# Patient Record
Sex: FEMALE | URBAN - METROPOLITAN AREA
[De-identification: names, ages, dates, MRNs, and addresses within clinical notes are randomized per-mention and may not be internally consistent; named-entity substitution may affect disease eponyms.]

---

## 2024-03-01 PROCEDURE — 93005 ELECTROCARDIOGRAM TRACING: CPT

## 2024-03-01 PROCEDURE — 99285 EMERGENCY DEPT VISIT HI MDM: CPT | Mod: 25

## 2024-03-01 PROCEDURE — 96360 HYDRATION IV INFUSION INIT: CPT

## 2024-03-02 ENCOUNTER — HOSPITAL ENCOUNTER (EMERGENCY)
Facility: HOSPITAL | Age: 44
Discharge: PSYCHIATRIC OR SUBSTANCE USE FACILITY | End: 2024-03-02
Attending: EMERGENCY MEDICINE
Payer: COMMERCIAL

## 2024-03-02 VITALS
SYSTOLIC BLOOD PRESSURE: 151 MMHG | DIASTOLIC BLOOD PRESSURE: 84 MMHG | RESPIRATION RATE: 20 BRPM | BODY MASS INDEX: 30.68 KG/M2 | TEMPERATURE: 98 F | HEART RATE: 61 BPM | HEIGHT: 64 IN | WEIGHT: 179.69 LBS | OXYGEN SATURATION: 98 %

## 2024-03-02 DIAGNOSIS — F32.A DEPRESSION WITH SUICIDAL IDEATION: ICD-10-CM

## 2024-03-02 DIAGNOSIS — S20.211A RIB CONTUSION, RIGHT, INITIAL ENCOUNTER: ICD-10-CM

## 2024-03-02 DIAGNOSIS — R45.851 DEPRESSION WITH SUICIDAL IDEATION: ICD-10-CM

## 2024-03-02 DIAGNOSIS — T50.902A SUICIDE ATTEMPT BY DRUG OVERDOSE: ICD-10-CM
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
## 2024-03-02 DIAGNOSIS — S09.90XA MINOR HEAD INJURY, INITIAL ENCOUNTER: ICD-10-CM

## 2024-03-02 DIAGNOSIS — S46.911A STRAIN OF RIGHT SHOULDER, INITIAL ENCOUNTER: ICD-10-CM

## 2024-03-02 DIAGNOSIS — T50.901A OVERDOSE: ICD-10-CM

## 2024-03-02 DIAGNOSIS — T14.90XA TRAUMA: ICD-10-CM

## 2024-03-02 LAB
ALBUMIN SERPL-MCNC: 4 G/DL (ref 3.5–5)
ALBUMIN/GLOB SERPL: 1.2 RATIO (ref 1.1–2)
ALP SERPL-CCNC: 105 UNIT/L (ref 40–150)
ALT SERPL-CCNC: 68 UNIT/L (ref 0–55)
AMPHET UR QL SCN: NEGATIVE
APAP SERPL-MCNC: <17.4 UG/ML (ref 17.4–30)
APPEARANCE UR: CLEAR
AST SERPL-CCNC: 48 UNIT/L (ref 5–34)
B-HCG SERPL QL: NEGATIVE
BACTERIA #/AREA URNS AUTO: ABNORMAL /HPF
BARBITURATE SCN PRESENT UR: NEGATIVE
BASOPHILS # BLD AUTO: 0.04 X10(3)/MCL
BASOPHILS NFR BLD AUTO: 0.6 %
BENZODIAZ UR QL SCN: POSITIVE
BILIRUB SERPL-MCNC: 0.3 MG/DL
BILIRUB UR QL STRIP.AUTO: NEGATIVE
BUN SERPL-MCNC: 11.8 MG/DL (ref 7–18.7)
CALCIUM SERPL-MCNC: 9.7 MG/DL (ref 8.4–10.2)
CANNABINOIDS UR QL SCN: NEGATIVE
CHLORIDE SERPL-SCNC: 112 MMOL/L (ref 98–107)
CK SERPL-CCNC: 182 U/L (ref 29–168)
CO2 SERPL-SCNC: 21 MMOL/L (ref 22–29)
COCAINE UR QL SCN: NEGATIVE
COLOR UR AUTO: ABNORMAL
CREAT SERPL-MCNC: 0.87 MG/DL (ref 0.55–1.02)
EOSINOPHIL # BLD AUTO: 0.39 X10(3)/MCL (ref 0–0.9)
EOSINOPHIL NFR BLD AUTO: 5.7 %
ERYTHROCYTE [DISTWIDTH] IN BLOOD BY AUTOMATED COUNT: 13.4 % (ref 11.5–17)
ETHANOL SERPL-MCNC: <10 MG/DL
FENTANYL UR QL SCN: NEGATIVE
GFR SERPLBLD CREATININE-BSD FMLA CKD-EPI: >60 MLS/MIN/1.73/M2
GLOBULIN SER-MCNC: 3.3 GM/DL (ref 2.4–3.5)
GLUCOSE SERPL-MCNC: 83 MG/DL (ref 74–100)
GLUCOSE UR QL STRIP.AUTO: NORMAL
HCT VFR BLD AUTO: 46.3 % (ref 37–47)
HGB BLD-MCNC: 15.3 G/DL (ref 12–16)
IMM GRANULOCYTES # BLD AUTO: 0.01 X10(3)/MCL (ref 0–0.04)
IMM GRANULOCYTES NFR BLD AUTO: 0.1 %
KETONES UR QL STRIP.AUTO: NEGATIVE
LEUKOCYTE ESTERASE UR QL STRIP.AUTO: NEGATIVE
LYMPHOCYTES # BLD AUTO: 2.42 X10(3)/MCL (ref 0.6–4.6)
LYMPHOCYTES NFR BLD AUTO: 35.4 %
MCH RBC QN AUTO: 32.1 PG (ref 27–31)
MCHC RBC AUTO-ENTMCNC: 33 G/DL (ref 33–36)
MCV RBC AUTO: 97.1 FL (ref 80–94)
MDMA UR QL SCN: NEGATIVE
MONOCYTES # BLD AUTO: 0.55 X10(3)/MCL (ref 0.1–1.3)
MONOCYTES NFR BLD AUTO: 8.1 %
MUCOUS THREADS URNS QL MICRO: ABNORMAL /LPF
NEUTROPHILS # BLD AUTO: 3.42 X10(3)/MCL (ref 2.1–9.2)
NEUTROPHILS NFR BLD AUTO: 50.1 %
NITRITE UR QL STRIP.AUTO: NEGATIVE
NRBC BLD AUTO-RTO: 0 %
OHS QRS DURATION: 92 MS
OHS QTC CALCULATION: 431 MS
OPIATES UR QL SCN: POSITIVE
PCP UR QL: NEGATIVE
PH UR STRIP.AUTO: 7 [PH]
PH UR: 7 [PH] (ref 3–11)
PLATELET # BLD AUTO: 241 X10(3)/MCL (ref 130–400)
PMV BLD AUTO: 10.3 FL (ref 7.4–10.4)
POTASSIUM SERPL-SCNC: 4 MMOL/L (ref 3.5–5.1)
PROT SERPL-MCNC: 7.3 GM/DL (ref 6.4–8.3)
PROT UR QL STRIP.AUTO: ABNORMAL
RBC # BLD AUTO: 4.77 X10(6)/MCL (ref 4.2–5.4)
RBC #/AREA URNS AUTO: ABNORMAL /HPF
RBC UR QL AUTO: NEGATIVE
SALICYLATES SERPL-MCNC: <5 MG/DL (ref 15–30)
SARS-COV-2 RDRP RESP QL NAA+PROBE: NEGATIVE
SODIUM SERPL-SCNC: 141 MMOL/L (ref 136–145)
SP GR UR STRIP.AUTO: >1.05 (ref 1–1.03)
SPECIFIC GRAVITY, URINE AUTO (.000) (OHS): >1.05 (ref 1–1.03)
SQUAMOUS #/AREA URNS LPF: ABNORMAL /HPF
TSH SERPL-ACNC: 2.88 UIU/ML (ref 0.35–4.94)
UROBILINOGEN UR STRIP-ACNC: 4
WBC # SPEC AUTO: 6.83 X10(3)/MCL (ref 4.5–11.5)
WBC #/AREA URNS AUTO: ABNORMAL /HPF

## 2024-03-02 PROCEDURE — 25000003 PHARM REV CODE 250: Performed by: EMERGENCY MEDICINE

## 2024-03-02 PROCEDURE — 80143 DRUG ASSAY ACETAMINOPHEN: CPT | Performed by: EMERGENCY MEDICINE

## 2024-03-02 PROCEDURE — 87635 SARS-COV-2 COVID-19 AMP PRB: CPT | Performed by: EMERGENCY MEDICINE

## 2024-03-02 PROCEDURE — 84443 ASSAY THYROID STIM HORMONE: CPT | Performed by: EMERGENCY MEDICINE

## 2024-03-02 PROCEDURE — 85025 COMPLETE CBC W/AUTO DIFF WBC: CPT | Performed by: EMERGENCY MEDICINE

## 2024-03-02 PROCEDURE — 82077 ASSAY SPEC XCP UR&BREATH IA: CPT | Performed by: EMERGENCY MEDICINE

## 2024-03-02 PROCEDURE — 80179 DRUG ASSAY SALICYLATE: CPT | Performed by: EMERGENCY MEDICINE

## 2024-03-02 PROCEDURE — 81025 URINE PREGNANCY TEST: CPT | Performed by: EMERGENCY MEDICINE

## 2024-03-02 PROCEDURE — 80053 COMPREHEN METABOLIC PANEL: CPT | Performed by: EMERGENCY MEDICINE

## 2024-03-02 PROCEDURE — 82550 ASSAY OF CK (CPK): CPT | Performed by: EMERGENCY MEDICINE

## 2024-03-02 PROCEDURE — 81001 URINALYSIS AUTO W/SCOPE: CPT | Performed by: EMERGENCY MEDICINE

## 2024-03-02 PROCEDURE — 25500020 PHARM REV CODE 255: Performed by: EMERGENCY MEDICINE

## 2024-03-02 PROCEDURE — 80307 DRUG TEST PRSMV CHEM ANLYZR: CPT | Performed by: EMERGENCY MEDICINE

## 2024-03-02 RX ORDER — IBUPROFEN 200 MG
400 TABLET ORAL
Status: COMPLETED | OUTPATIENT
Start: 2024-03-02 | End: 2024-03-02

## 2024-03-02 RX ORDER — LIDOCAINE 50 MG/G
1 PATCH TOPICAL
Status: DISCONTINUED | OUTPATIENT
Start: 2024-03-02 | End: 2024-03-02 | Stop reason: HOSPADM

## 2024-03-02 RX ORDER — METOPROLOL TARTRATE 50 MG/1
50 TABLET ORAL 2 TIMES DAILY
Status: DISCONTINUED | OUTPATIENT
Start: 2024-03-02 | End: 2024-03-02 | Stop reason: HOSPADM

## 2024-03-02 RX ORDER — HYDROCHLOROTHIAZIDE 25 MG/1
25 TABLET ORAL DAILY
Status: DISCONTINUED | OUTPATIENT
Start: 2024-03-02 | End: 2024-03-02 | Stop reason: HOSPADM

## 2024-03-02 RX ADMIN — IOHEXOL 100 ML: 350 INJECTION, SOLUTION INTRAVENOUS at 01:03

## 2024-03-02 RX ADMIN — IBUPROFEN 400 MG: 200 TABLET, FILM COATED ORAL at 09:03

## 2024-03-02 RX ADMIN — SODIUM CHLORIDE 1000 ML: 9 INJECTION, SOLUTION INTRAVENOUS at 12:03

## 2024-03-02 RX ADMIN — HYDROCHLOROTHIAZIDE 25 MG: 25 TABLET ORAL at 09:03

## 2024-03-02 RX ADMIN — LIDOCAINE 1 PATCH: 50 PATCH CUTANEOUS at 09:03

## 2024-03-02 NOTE — ED PROVIDER NOTES
"Encounter Date: 3/1/2024       History     Chief Complaint   Patient presents with    Suicide Attempt     Pt reports taking "entire bottles" of these medications over several different nights: Lortabs, klonopin, bystallic, tramadol, topamax - last was a bottle of topamax last night around 2200. Pt reports her  has terminal CA and is extremely depressed, "needs help". Sisters report this began spiraling over last few months beginning with ETOH dependency. Also reports falling today and injured right shoulder and under right breast     43-year-old female presents to the emergency department for evaluation after intentional overdose on multiple medications over the last few days.  Reports last ingestion was Topamax on Thursday evening, states she took "the whole bottle".  Admits that she has been intentionally harming herself in the setting of depression related to her 's terminal illness with a cancer diagnosis.  Reports has fallen multiple times over the last couple of days, unsure if she struck her head but complaining of right shoulder pain, right chest wall pain.    The history is provided by the patient and a relative.     Review of patient's allergies indicates:   Allergen Reactions    Pcn [penicillins]     Sulfa (sulfonamide antibiotics) Itching     No past medical history on file.  No past surgical history on file.  No family history on file.     Review of Systems   Constitutional:  Negative for fever.   Respiratory:  Negative for chest tightness and shortness of breath.    Cardiovascular:  Positive for chest pain.   Gastrointestinal:  Negative for abdominal pain, nausea and vomiting.   Neurological:  Negative for headaches.   Psychiatric/Behavioral:  Positive for self-injury and suicidal ideas.        Physical Exam     Initial Vitals [03/01/24 2356]   BP Pulse Resp Temp SpO2   (!) 159/101 75 14 98.1 °F (36.7 °C) 100 %      MAP       --         Physical Exam    Nursing note and vitals " reviewed.  Constitutional: She appears well-developed and well-nourished. No distress.   HENT:   Head: Normocephalic and atraumatic.   Mouth/Throat: Oropharynx is clear and moist.   Eyes: Conjunctivae are normal. No scleral icterus.   Neck: Neck supple.   Normal range of motion.  Cardiovascular:  Normal rate and regular rhythm.           Pulmonary/Chest: Breath sounds normal. She exhibits tenderness (right chest wall).   Abdominal: Abdomen is soft. She exhibits no distension. There is no abdominal tenderness.   Musculoskeletal:      Cervical back: Normal range of motion and neck supple.      Comments: Right shoulder tenderness, no gross deformity, pain with range of motion     Neurological: She is alert and oriented to person, place, and time. She has normal strength. No cranial nerve deficit. GCS score is 15. GCS eye subscore is 4. GCS verbal subscore is 5. GCS motor subscore is 6.   Slurred speech   Skin: Skin is warm and dry.         ED Course   Procedures  Labs Reviewed   COMPREHENSIVE METABOLIC PANEL - Abnormal; Notable for the following components:       Result Value    Chloride 112 (*)     Carbon Dioxide 21 (*)     Alanine Aminotransferase 68 (*)     Aspartate Aminotransferase 48 (*)     All other components within normal limits   URINALYSIS, REFLEX TO URINE CULTURE - Abnormal; Notable for the following components:    Specific Gravity, UA >1.050 (*)     Protein, UA Trace (*)     Urobilinogen, UA 4.0 (*)     Mucous, UA Trace (*)     All other components within normal limits   DRUG SCREEN, URINE (BEAKER) - Abnormal; Notable for the following components:    Benzodiazepine, Urine Positive (*)     Opiates, Urine Positive (*)     Specific Gravity, Urine Auto >1.050 (*)     All other components within normal limits    Narrative:     Cut off concentrations:    Amphetamines - 1000 ng/ml  Barbiturates - 200 ng/ml  Benzodiazepine - 200 ng/ml  Cannabinoids (THC) - 50 ng/ml  Cocaine - 300 ng/ml  Fentanyl - 1.0 ng/ml  MDMA  - 500 ng/ml  Opiates - 300 ng/ml   Phencyclidine (PCP) - 25 ng/ml    Specimen submitted for drug analysis and tested for pH and specific gravity in order to evaluate sample integrity. Suspect tampering if specific gravity is <1.003 and/or pH is not within the range of 4.5 - 8.0  False negatives may result form substances such as bleach added to urine.  False positives may result for the presence of a substance with similar chemical structure to the drug or its metabolite.    This test provides only a PRELIMINARY analytical test result. A more specific alternate chemical method must be used in order to obtain a confirmed analytical result. Gas chromatography/mass spectrometry (GC/MS) is the preferred confirmatory method. Other chemical confirmation methods are available. Clinical consideration and professional judgement should be applied to any drug of abuse test result, particularly when preliminary positive results are used.    Positive results will be confirmed only at the physicians request. Unconfirmed screening results are to be used only for medical purposes (treatment).        ACETAMINOPHEN LEVEL - Abnormal; Notable for the following components:    Acetaminophen Level <17.4 (*)     All other components within normal limits   CBC WITH DIFFERENTIAL - Abnormal; Notable for the following components:    MCV 97.1 (*)     MCH 32.1 (*)     All other components within normal limits   CK - Abnormal; Notable for the following components:    Creatine Kinase 182 (*)     All other components within normal limits   SALICYLATE LEVEL - Abnormal; Notable for the following components:    Salicylate Level <5.0 (*)     All other components within normal limits   ACETAMINOPHEN LEVEL - Abnormal; Notable for the following components:    Acetaminophen Level <17.4 (*)     All other components within normal limits   TSH - Normal   ALCOHOL,MEDICAL (ETHANOL) - Normal   SARS-COV-2 RNA AMPLIFICATION, QUAL - Normal    Narrative:     The  IDNOW COVID-19 assay is a rapid molecular in vitro diagnostic test utilizing an isothermal nucleic acid amplification technology intended for the qualitative detection of nucleic acid from the SARS-CoV-2 viral RNA in direct nasal, nasopharyngeal or throat swabs from individuals who are suspected of COVID-19 by their healthcare provider.   CBC W/ AUTO DIFFERENTIAL    Narrative:     The following orders were created for panel order CBC auto differential.  Procedure                               Abnormality         Status                     ---------                               -----------         ------                     CBC with Differential[[id number]]       Abnormal            Final result                 Please view results for these tests on the individual orders.     EKG Readings: (Independently Interpreted)   Initial Reading: No STEMI. Rhythm: Normal Sinus Rhythm. Heart Rate: 72. Ectopy: No Ectopy. ST Segments: Normal ST Segments. T Waves: Normal. Axis: Normal. Clinical Impression: Normal Sinus Rhythm   03/01/2024 @ 2357       Imaging Results              CT Cervical Spine Without Contrast (Preliminary result)  Result time 03/02/24 02:15:30      Preliminary result by Eliseo Yates MD (03/02/24 02:15:30)                   Narrative:    START OF REPORT:  Technique: CT of the cervical spine was performed without intravenous contrast with axial as well as sagittal and coronal images.    Comparison: None.    Dosage Information: Automated exposure control was utilized.    Clinical history: Fall injury.    Findings:  Lung apices: The visualized lung apices appear unremarkable.  Spine:  Spinal canal: The spinal canal appears unremarkable.  Rotation: No significant rotation is seen.  Vertebral Fusion: No vertebral fusion is identified.  Listhesis: No significant listhesis is identified.  Lordosis: Mild straightening of the cervical lordosis is seen. This may be positional or reflect an element of  myospasm.  Intervertebral disc spaces: The intervertebral discs are preserved throughout.  Osteophytes: A medium sized spur is seen at the anteroinferior endplate of C5.  Endplate Sclerosis: No significant endplate sclerosis is seen.  Uncovertebral degenerative changes: Mild multilevel uncovertebral joint arthrosis is seen.  Facet degenerative changes: Mild multilevel facet degenerative changes are seen.  Calcifications: There is segmental calcification of the nuchal ligament on series 6 image 25.  Fractures: No acute cervical spine fracture dislocation or subluxation is seen.  Orthopedic Hardware: None.    Miscellaneous:  Mastoid air cells: Moderate opacity is seen in the bilateral mastoid air cells with sclerotic changes, more on the right. This suggests an element of mastoiditis. Correlate with clinical and laboratory findings as regards additional evaluation and follow up.  Soft Tissues: Unremarkable.      Impression:  1. No acute cervical spine fracture dislocation or subluxation is seen.  2. Degenerative changes and other details as above.                                         CT Chest With Contrast (Preliminary result)  Result time 03/02/24 02:09:21      Preliminary result by Eliseo Yates MD (03/02/24 02:09:21)                   Narrative:    START OF REPORT:  Technique: CT Scan of the chest was performed without intravenous contrast with direct axial as well as sagittal and, coronal, reconstruction images.    Dosage Information: Automated Exposure Control was utilized 750.59 mGy.cm.    Comparison: None.    Clinical History: Fall injury.    Findings:  Soft Tissues: Unremarkable.  Lines and Tubes: None.  Neck: The visualized soft tissues of the neck appear unremarkable.  Mediastinum: The mediastinal structures are within normal limits.  Heart: The heart appears unremarkable.  Aorta: Unremarkable appearing aorta. No aortic dissection or aneurysm is seen.  Pulmonary Arteries: Unremarkable. No filling  defects are seen in the pulmonary arteries to suggest pulmonary embolus.  Lungs: There is moderate non specific dependent change at the lung bases. No acute focal consolidation is identified.  Pleura: No effusions or pneumothorax are identified.  Bony Structures:  Spine: The visualized dorsal spine appears unremarkable.  Ribs: No rib fractures are identified. The bilateral ribs appear unremarkable.  Abdomen: The visualized upper abdominal organs appear unremarkable.      Impression:  1. No rib fractures are identified.  2. No acute focal consolidation is identified.  3. No acute intrathoracic process is identified. Details and other findings as discussed above.                                         CT Head Without Contrast (Preliminary result)  Result time 03/02/24 02:06:01      Preliminary result by Eliseo Yates MD (03/02/24 02:06:01)                   Narrative:    START OF REPORT:  Technique: CT of the head was performed without intravenous contrast with axial as well as coronal and sagittal images.    Comparison: None.    Dosage Information: Automated Exposure Control was utilized 1082.46 mGy.cm.    Clinical history: Fall injury.    Findings:  Hemorrhage: No acute intracranial hemorrhage is seen.  CSF spaces: The ventricles sulci and basal cisterns are within normal limits.  Brain parenchyma: There is preservation of the grey white junction throughout. No acute infarct.  Cerebellum: Unremarkable.  Vascular: Unremarkable venous sinuses.  Sella and skull base: The sella appears to be within normal limits for age.  Cerebellopontine angles: Within normal limits.  Herniation: None.  Intracranial calcifications: Incidental note is made of bilateral choroid plexus calcification. Incidental note is made of some pineal region calcification.  Calvarium: No acute linear or depressed skull fracture is seen.  Scalp: No significant scalp soft tissue swelling is seen.    Maxillofacial Structures:  Paranasal sinuses: The  visualized paranasal sinuses appear clear with no mucoperiosteal thickening or air fluid levels identified.  Orbits: The orbits appear unremarkable.  Zygomatic arches: The zygomatic arches are intact and unremarkable.  Temporal bones and mastoids: The bilateral mastoid air cells partially opacified somewhat sclerotic a possible element of mastoiditis.  TMJ: The mandibular condyles appear normally placed with respect to the mandibular fossa.      Impression:  1. The bilateral mastoid air cells partially opacified somewhat sclerotic a possible element of mastoiditis. Correlate clinically.  2. No acute intracranial traumatic injury identified. Details and other findings as noted above.                                         X-Ray Chest AP Portable (In process)                      X-ray Shoulder 2 or More Views Right (In process)                      Medications   hydroCHLOROthiazide tablet 25 mg (has no administration in time range)   metoprolol tartrate (LOPRESSOR) tablet 50 mg (has no administration in time range)   sodium chloride 0.9% bolus 1,000 mL 1,000 mL (0 mLs Intravenous Stopped 3/2/24 0115)   iohexoL (OMNIPAQUE 350) injection 100 mL (100 mLs Intravenous Given 3/2/24 0157)     Medical Decision Making  Problems Addressed:  Depression with suicidal ideation: acute illness or injury that poses a threat to life or bodily functions  Fall, initial encounter: acute illness or injury  Minor head injury, initial encounter: acute illness or injury  Rib contusion, right, initial encounter: acute illness or injury  Strain of right shoulder, initial encounter: acute illness or injury  Suicide attempt by drug overdose: acute illness or injury that poses a threat to life or bodily functions    Amount and/or Complexity of Data Reviewed  Labs: ordered.  Radiology: ordered.    Risk  Prescription drug management.      ED assessment:    Mrs. Westfall presented with report of intentional overdose as suicidal gesture.  Reports  multiple substances over the last several days.  Additionally reports somnolent, multiple falls over the last several days as well.  Physician's Emergency certificate filed for danger to self given the patient's verbalized suicidal ideations as well as her suicide gesture.  With the multiple falls, speech changes in right chest wall pain, will obtain imaging to ensure that she has not sustained any clinically significant traumatic injuries.  In addition, will undertake laboratory studies as part of protocol to medically clear for psychiatric admission.  Case discussed with poison control who recommended toxicology labs including acetaminophen level both on arrival as well as after 4 hour observation.  Advised if remains stable after 6 hours observation in the ED, would be able to medically clear for psychiatric placement at that time.    Differential diagnosis (including but not limited to):   Depression, suicidal ideation, intentional overdose, suicide attempt, polysubstance intoxication, acute kidney injury, electrolyte derangements, rhabdomyolysis, closed head injury, intracranial hemorrhage, cerebral contusion, concussion, rib fracture, chest contusion, shoulder fracture, clavicle fracture, shoulder strain, contusion    ED management:   See ED course below    My independent radiology interpretation:   Chest x-ray:  No displaced rib fracture, no pneumothorax, no focal infiltrate or effusion   X-ray right shoulder:  No acute fracture subluxation.    Amount and/or Complexity of Data Reviewed  Independent historian: daughter    Summary of history:  Daughters at bedside reported that this is the patient's 5th suicide attempt.  State that she has been very depressed, having difficulty coping with her has been terminal illness.  Has been ingesting excessive quantities of medications for several days now.  Noted multiple falls over the last couple of days as well.    External data reviewed:  No pertinent recent EMR  encounters.  Last visits from 2020.   Summary of data reviewed: as below  Risk and benefits of testing: discussed   Labs: ordered and reviewed  Radiology: ordered and independent interpretation performed (see above or ED course)  ECG/medicine tests: ordered and independent interpretation performed (see above or ED course)  Discussion of management or test interpretation with external provider(s): discussed with poison control consultant   Summary of discussion: Poison control called. Spoke with Leeanna. Advised cardiac monitoring, to monitor renal and liver function, ETOH, UDS, acetaminophen x 2, recommends 6 hr obs. Will call back in a few hours for update on pt.      Risk  Prescription drug management   Decision regarding transfer   Shared decision making     Critical Care  none    I, Ann Marie Pedraza MD personally performed the history, PE, MDM, and procedures as documented above and agree with the scribe's documentation.                ED Course as of 03/02/24 0618   Sat Mar 02, 2024   0258 Imaging evaluated for potential injury sustained in her falls at home demonstrates no clinically significant acute findings.  Initial laboratory studies with no clinically significant acute findings.  As per recommendations by poison Control, will plan for 4 hour Tylenol level; however with initial level 0, anticipate that it will remain low and that she will be able to be medically cleared for psychiatric admission [KS]      ED Course User Index  [KS] Ann Marie Pedraza MD       Medically cleared for psychiatry placement: 3/2/2024  6:18 AM                   Clinical Impression:  Final diagnoses:  [T50.901A] Overdose  [T14.90XA] Trauma  [T50.902A] Suicide attempt by drug overdose  [F32.A, R45.851] Depression with suicidal ideation  [W19.XXXA] Fall, initial encounter  [S46.911A] Strain of right shoulder, initial encounter  [S09.90XA] Minor head injury, initial encounter  [S20.211A] Rib contusion, right, initial encounter          ED  Disposition Condition    Transfer to [facility] Stable          ED Prescriptions    None       Follow-up Information    None          Ann Marie Pedraza MD  03/02/24 0618

## 2024-03-02 NOTE — ED NOTES
Poison control called by Ann Marie Marrufo RN. Spoke with Leeanna. Advised cardiac monitoring, to monitor renal and liver function, ETOH, UDS, acetaminophen x 2, recommends 6 hr obs. Will call back in a few hours for update on pt.

## 2024-03-02 NOTE — ED NOTES
Accepted at [facility] by Ramón Spencer NP. Report # [phone]  Will hold on to patient until more alert, [facility] aware. Told to contact if pt condition changes